# Patient Record
Sex: FEMALE | HISPANIC OR LATINO | Employment: FULL TIME | URBAN - METROPOLITAN AREA
[De-identification: names, ages, dates, MRNs, and addresses within clinical notes are randomized per-mention and may not be internally consistent; named-entity substitution may affect disease eponyms.]

---

## 2024-05-16 ENCOUNTER — OFFICE VISIT (OUTPATIENT)
Dept: INTERNAL MEDICINE | Facility: CLINIC | Age: 34
End: 2024-05-16
Payer: COMMERCIAL

## 2024-05-16 VITALS
DIASTOLIC BLOOD PRESSURE: 83 MMHG | SYSTOLIC BLOOD PRESSURE: 122 MMHG | HEART RATE: 80 BPM | OXYGEN SATURATION: 98 % | WEIGHT: 218.3 LBS

## 2024-05-16 DIAGNOSIS — Z11.59 NEED FOR HEPATITIS C SCREENING TEST: ICD-10-CM

## 2024-05-16 DIAGNOSIS — Z12.4 CERVICAL CANCER SCREENING: ICD-10-CM

## 2024-05-16 DIAGNOSIS — E11.29 TYPE 2 DIABETES MELLITUS WITH DIABETIC MICROALBUMINURIA, WITHOUT LONG-TERM CURRENT USE OF INSULIN (H): Primary | ICD-10-CM

## 2024-05-16 DIAGNOSIS — R80.9 TYPE 2 DIABETES MELLITUS WITH DIABETIC MICROALBUMINURIA, WITHOUT LONG-TERM CURRENT USE OF INSULIN (H): Primary | ICD-10-CM

## 2024-05-16 DIAGNOSIS — Z11.4 SCREENING FOR HIV (HUMAN IMMUNODEFICIENCY VIRUS): ICD-10-CM

## 2024-05-16 PROCEDURE — 99203 OFFICE O/P NEW LOW 30 MIN: CPT | Mod: GC

## 2024-05-16 RX ORDER — METFORMIN HCL 500 MG
500 TABLET, EXTENDED RELEASE 24 HR ORAL
COMMUNITY
Start: 2024-04-19 | End: 2024-07-18

## 2024-05-16 RX ORDER — TIMOLOL MALEATE 5 MG/ML
1 SOLUTION/ DROPS OPHTHALMIC DAILY
COMMUNITY

## 2024-05-16 NOTE — PATIENT INSTRUCTIONS
"We prescribed you a new glucometer   - You do not have to check your blood sugars daily, but if you do, check it in the morning before breakfast.   - Check your blood sugar if you feel \"different\", Sweating, fatigue, tremors would be signs of low blood sugar.   - Goal Blood sugar should be 125      "

## 2024-05-16 NOTE — PROGRESS NOTES
Assessment & Plan     Ms. Kwan is a 33-year-old woman with PMHx notable for recently diagnosed type 2 diabetes, who presents to establish care and to discuss her diabetes care.     Type 2 diabetes mellitus with diabetic microalbuminuria, without long-term current use of insulin (H)  Patient recently was seen by her family medicine physician with a A1c of 8.  UA noted protein with slightly elevated albumin/creatinine ratio.  Patient also notes neuropathy in her bilateral lower extremities no vision concerns.  Patient was instructed to follow-up with an ophthalmologist for diabetic eye exam which she has yet to schedule.  Provided patient with referral to diabetes education and glucometer.  Instructed patient to check blood sugar once a day prior to breakfast with a goal blood sugar less than 125.  Will follow-up with patient in 3 months to repeat A1c. Noted high triglycerides with LDL of 101 and HDL of 35. No indications for statins currently.  - Adult Diabetes Education  Referral; Future  - Comprehensive metabolic panel (BMP + Alb, Alk Phos, ALT, AST, Total. Bili, TP); Future  - blood glucose monitoring (NO BRAND SPECIFIED) meter device kit; Use to test blood sugar 1 times daily or as directed.  - blood glucose (NO BRAND SPECIFIED) lancets standard; Use to test blood sugar 1 times daily or as directed.  - blood glucose (NO BRAND SPECIFIED) test strip; Use to test blood sugar 1 times daily or as directed.    Screening for HIV (human immunodeficiency virus)  - HIV Screening; Future    Cervical cancer screening  Patient last had Pap smear 2021.  Patient is due for repeat Pap smear.  Will do Pap smear at 3-month follow-up.          Return in about 3 months (around 8/16/2024).    Dusty Kaplan is a 33 year old, presenting for the following health issues:  Establish Care (Pt here to establish care and discuss diabetes concerns)      5/16/2024     8:06 AM   Additional Questions   Roomed by MJL, EMT      History of Present Illness       Diabetes:   She presents for follow up of diabetes.    She is not checking blood glucose.        She is concerned about low blood sugar, several less than 70 in the past few weeks.    She is not experiencing numbness or burning in feet, excessive thirst, blurry vision, weight changes or redness, sores or blisters on feet.           She eats 0-1 servings of fruits and vegetables daily.She consumes 0 sweetened beverage(s) daily.She exercises with enough effort to increase her heart rate 20 to 29 minutes per day.  She exercises with enough effort to increase her heart rate 4 days per week.   She is taking medications regularly.    Patient noted that she recently saw her family medicine physician due to an elevated A1c of 8.  She was concerned about hypoglycemia episodes and whether or not she needs a glucometer.  She notes that she has multiple family members with type 2 diabetes and they all checked her blood sugars regularly she was wondering if she needs to do the same.  Patient notes she had 1 episode of uncharacteristic fatigue and dizziness that improved with oral intake.  She wonders if this episode was hypoglycemia.  She also has several questions regarding what diabetes is and how it can be managed. Patient notes that she has attempted some lifestyle modifications with decreased sugary drinks and increased exercise.    Review of Systems  Constitutional, HEENT, cardiovascular, pulmonary, gi and gu systems are negative, except as otherwise noted.      Objective    /83 (BP Location: Left arm, Patient Position: Sitting, Cuff Size: Adult Regular)   Pulse 80   Wt 99 kg (218 lb 4.8 oz)   LMP 05/09/2024 (Approximate)   SpO2 98%   There is no height or weight on file to calculate BMI.  Physical Exam   GENERAL: alert and no distress  NECK: no adenopathy, no asymmetry, masses, or scars  RESP: lungs clear to auscultation - no rales, rhonchi or wheezes  CV: regular rate and  rhythm, normal S1 S2, no S3 or S4, no murmur, click or rub, no peripheral edema  ABDOMEN: soft, nontender, no hepatosplenomegaly, no masses and bowel sounds normal  MS: no gross musculoskeletal defects noted, no edema    No results found for any previous visit.           Signed Electronically by: KOFFI LUBIN MD

## 2024-05-19 ENCOUNTER — HEALTH MAINTENANCE LETTER (OUTPATIENT)
Age: 34
End: 2024-05-19

## 2024-05-24 ENCOUNTER — LAB (OUTPATIENT)
Dept: LAB | Facility: CLINIC | Age: 34
End: 2024-05-24
Payer: COMMERCIAL

## 2024-05-24 ENCOUNTER — TELEPHONE (OUTPATIENT)
Dept: INTERNAL MEDICINE | Facility: CLINIC | Age: 34
End: 2024-05-24

## 2024-05-24 DIAGNOSIS — E11.29 TYPE 2 DIABETES MELLITUS WITH DIABETIC MICROALBUMINURIA, WITHOUT LONG-TERM CURRENT USE OF INSULIN (H): ICD-10-CM

## 2024-05-24 DIAGNOSIS — Z11.4 SCREENING FOR HIV (HUMAN IMMUNODEFICIENCY VIRUS): ICD-10-CM

## 2024-05-24 DIAGNOSIS — R80.9 TYPE 2 DIABETES MELLITUS WITH DIABETIC MICROALBUMINURIA, WITHOUT LONG-TERM CURRENT USE OF INSULIN (H): ICD-10-CM

## 2024-05-24 LAB
ALBUMIN SERPL BCG-MCNC: 4.5 G/DL (ref 3.5–5.2)
ALP SERPL-CCNC: 100 U/L (ref 40–150)
ALT SERPL W P-5'-P-CCNC: 58 U/L (ref 0–50)
ANION GAP SERPL CALCULATED.3IONS-SCNC: 11 MMOL/L (ref 7–15)
AST SERPL W P-5'-P-CCNC: 43 U/L (ref 0–45)
BILIRUB SERPL-MCNC: 0.9 MG/DL
BUN SERPL-MCNC: 10.1 MG/DL (ref 6–20)
CALCIUM SERPL-MCNC: 9.7 MG/DL (ref 8.6–10)
CHLORIDE SERPL-SCNC: 103 MMOL/L (ref 98–107)
CREAT SERPL-MCNC: 0.56 MG/DL (ref 0.51–0.95)
DEPRECATED HCO3 PLAS-SCNC: 24 MMOL/L (ref 22–29)
EGFRCR SERPLBLD CKD-EPI 2021: >90 ML/MIN/1.73M2
GLUCOSE SERPL-MCNC: 87 MG/DL (ref 70–99)
POTASSIUM SERPL-SCNC: 3.9 MMOL/L (ref 3.4–5.3)
PROT SERPL-MCNC: 7.8 G/DL (ref 6.4–8.3)
SODIUM SERPL-SCNC: 138 MMOL/L (ref 135–145)

## 2024-05-24 PROCEDURE — 36415 COLL VENOUS BLD VENIPUNCTURE: CPT

## 2024-05-24 PROCEDURE — 87389 HIV-1 AG W/HIV-1&-2 AB AG IA: CPT

## 2024-05-24 PROCEDURE — 80053 COMPREHEN METABOLIC PANEL: CPT

## 2024-05-25 LAB — HIV 1+2 AB+HIV1 P24 AG SERPL QL IA: NONREACTIVE

## 2024-05-30 ENCOUNTER — VIRTUAL VISIT (OUTPATIENT)
Dept: EDUCATION SERVICES | Facility: CLINIC | Age: 34
End: 2024-05-30
Attending: HOSPITALIST
Payer: COMMERCIAL

## 2024-05-30 ENCOUNTER — MYC MEDICAL ADVICE (OUTPATIENT)
Dept: EDUCATION SERVICES | Facility: CLINIC | Age: 34
End: 2024-05-30

## 2024-05-30 DIAGNOSIS — E11.29 TYPE 2 DIABETES MELLITUS WITH DIABETIC MICROALBUMINURIA, WITHOUT LONG-TERM CURRENT USE OF INSULIN (H): ICD-10-CM

## 2024-05-30 DIAGNOSIS — R80.9 TYPE 2 DIABETES MELLITUS WITH DIABETIC MICROALBUMINURIA, WITHOUT LONG-TERM CURRENT USE OF INSULIN (H): ICD-10-CM

## 2024-05-30 DIAGNOSIS — E11.29 TYPE 2 DIABETES MELLITUS WITH DIABETIC MICROALBUMINURIA, WITHOUT LONG-TERM CURRENT USE OF INSULIN (H): Primary | ICD-10-CM

## 2024-05-30 DIAGNOSIS — R80.9 TYPE 2 DIABETES MELLITUS WITH DIABETIC MICROALBUMINURIA, WITHOUT LONG-TERM CURRENT USE OF INSULIN (H): Primary | ICD-10-CM

## 2024-05-30 PROCEDURE — G0108 DIAB MANAGE TRN  PER INDIV: HCPCS | Mod: 95 | Performed by: DIETITIAN, REGISTERED

## 2024-05-30 RX ORDER — ACYCLOVIR 400 MG/1
TABLET ORAL
Qty: 3 EACH | Refills: 11 | Status: SHIPPED | OUTPATIENT
Start: 2024-05-30

## 2024-05-30 NOTE — PATIENT INSTRUCTIONS
"Hi Kaplan,    It was a pleasure meeting with you today!    Goals for Diabetes:    Check blood sugars at least 1 time(s) each day at varying times: before meals, after meals, and at bedtime.    Blood Glucose Targets:   Fasting and before meal: 80 - 130   2 hours after the start of a meal: less than 180    Continuous Glucose Monitor Goals:    Glucose Target Range  Recommended Time in Target Range   70 to 180 70% or more of the time   Less than 70 Less than 4% of the time   Above 180 Less than 25% of time       Activity helps to improve blood sugars. Try to incorporate 150 minutes per week (at least 10 minute at a time, and at least every other day)    Eat three balanced meals each day, consider using plate planning method, aiming for a variety of food groups including 1/4 plate starch/grains, 1/4 plate lean protein, and 1/2 plate non-starchy vegetables. Focus on \"high quality\" carbohydrates (whole grains, starchy vegetables, fresh fruits, beans/legumes). Limit added sugar and refined carbohydrates as much as possible.    Take diabetes medications as prescribed.    If you are overweight aim for a 5% weight loss. This will improve blood sugars, cholesterol, and blood pressure!     Follow up:  With PCP  With Diabetes Education as needed    Call or [employer] with any questions.    Contact information:   If you have concerns, please send a [employer] message or call the clinic at [phone].    For more urgent concerns that do not require 700, please call [phone] after hours/weekends and ask to speak with the Endocrinologist on call.    To schedule a Diabetes Education appointment, call [phone]    Please let us know if you are having low blood sugars less than 70 or over 300 mg/dL.  Do not wait until your next appointment if this is happening.      Sarah PAIZ  Diabetes Educator  [facility] [address] [address]  Phone: [phone]  Email: " [email or web address]

## 2024-05-30 NOTE — PROGRESS NOTES
Virtual Visit Details    Type of service:  Video Visit   Start Time: 10:00 am  End time: 10:30 am  Originating Location (pt. Location): Home    Distant Location (provider location):  On-site  Platform used for Video Visit: [employer]

## 2024-05-30 NOTE — PROGRESS NOTES
"Diabetes Self-Management Education & Support    Rosaura Kwan is a 33 year old who presents today for education related to Type 2 Diabetes  Patient is being treated with:  diet, oral agents, and exercise  She is accompanied by self    Year of diagnosis: April 15, 2024  Referring provider:  Kiel Holliday  Living Situation: with daughter  Employment: did not ask    PATIENT CONCERNS RELATED TO DIABETES SELF MANAGEMENT: new diagnosis      ASSESSMENT:  T2D seen for Comprehensive Knowledge Assessment and Instruction at the request of Dr. Holliday and Dr. Low. This is a new diagnosis for Rosaura. Started on Metformin 500 mg daily. She has made some changes in diet and is adding more walking. Trying to add more veggies in diet. Eats meals with daughter and daughter enjoys eating this way. Reviewed carbs and impact on blood glucose. Discussed blood glucose monitoring vs CGM.  Maryann would be interested in CGM and diabetes medication that would also help with weight loss. I think she would be a good candidate for GLP-1 or SGLT-2 drug.    PLAN:  Check blood sugars at least 1 time(s) each day at varying times: before meals, after meals, and at bedtime.    Blood Glucose Targets:   Fasting and before meal: 80 - 130   2 hours after the start of a meal: less than 180    Continuous Glucose Monitor Goals:    Glucose Target Range  Recommended Time in Target Range   70 to 180 70% or more of the time   Less than 70 Less than 4% of the time   Above 180 Less than 25% of time       Activity helps to improve blood sugars. Try to incorporate 150 minutes per week (at least 10 minute at a time, and at least every other day)    Eat three balanced meals each day, consider using plate planning method, aiming for a variety of food groups including 1/4 plate starch/grains, 1/4 plate lean protein, and 1/2 plate non-starchy vegetables. Focus on \"high quality\" carbohydrates (whole grains, starchy vegetables, fresh fruits, " "beans/legumes). Limit added sugar and refined carbohydrates as much as possible.    Take diabetes medications as prescribed.    If you are overweight aim for a 5% weight loss. This will improve blood sugars, cholesterol, and blood pressure!       SUBJECTIVE / OBJECTIVE:  Presents for: Initial Assessment for new diagnosis  Accompanied by: Self  Diabetes education in the past 24mo: No  Diabetes type: Type 2  Date of diagnosis: April 15, 2024  Transportation concerns: No    Strong family history of T2D on her father's side. Her father and all of his siblings have diabetes.    Monitoring:  Monitoring Assessed Today: Yes  Did patient bring glucose meter to appointment? : Yes  Times checking blood sugar at home (number): 1  Times checking blood sugar at home (per): Day  BG results: fasting glucose- 120s per patient   CGM: none    Labs:  No results found for: \"A1C\"  Lab Results   Component Value Date    GLC 87 05/24/2024     No results found for: \"LDL\"  No results found for: \"HDL\"  GFR Estimate   Date Value Ref Range Status   05/24/2024 >90 >60 mL/min/1.73m2 Final     Lab Results   Component Value Date    CR 0.56 05/24/2024     No results found for: \"MICROALBUMIN\"  No results found for: \"GADAB\"  No results found for: \"CPEPT\"      Diabetes Symptoms & Complications:            Taking Medications:  Diabetes Medication(s)       Biguanides       metFORMIN (GLUCOPHAGE XR) 500 MG 24 hr tablet Take 500 mg by mouth     metFORMIN (GLUCOPHAGE) 500 MG tablet                Problem Solving:             Patient is at risk of hypoglycemia?: NO  Hypoglycemia symptoms: none  Hospitalizations for hyper or hypoglycemia: No    Reducing Risks:   From CareEverywhere    Patient's A1C goal: <7.0    Being Active:  Being Active Assessed Today: Yes  Exercise:: Yes  Days per week of moderate to strenuous exercise (like a brisk walk): 2  On average, minutes per day of exercise at this level: 30  How intense was your typical exercise? : Moderate (like " brisk walking)  Exercise Minutes per Week: 60      Nutrition:     Healthy Eating Assessed Today: Yes  Who cooks/prepares meals for you?: Self  Who purchases food in  your home?: Self  How many times a week on average do you eat food made away from home (restaurant/take-out)?: 2 (on the weekends, mostly)  Meals include: Breakfast, Lunch, Dinner  Breakfast: overnight oats, 2-3 hb eggs  Lunch: chicken, salmon with steamed veggies and salads  Dinner: brown rice, black beans, chicken steak  Snacks: adding more - apple or beef stick  Beverages: Water  Has patient met with a dietitian in the past?: No      Healthy Coping:     Sources of stress identified by patient: My health      Topics that were covered in today's visit:  Basic pathophysiology of T2DM, relationship between carbohydrate intake, release of glucose from the liver, exercise and weight management on glucose control.    Self-monitoring of blood glucose (SMBG) using blood glucose meter and/or continuous glucose monitoring device.    Target blood glucose for pre-meal and 2 hour post-prandial glucose.    Action, timing, and potential side-effects of diabetes medications: Metformin, GLP-1, SGLT-2    Basic meal planning using the plate method, emphasizing portion control, healthy food choices, and eliminating or minimizing sugary beverages.    Need for consistent physical activity, 30-45 minutes duration, preferably 4-6 days per week.            Patient-stated goal written and given to Rosaura Kwan.  Verbalized and demonstrated understanding of instructions.       See patient instructions.  AVS provided to patient.    Time spent with patient at today's visit was 30 minutes.      Any diabetes medication initiation or dose changes were made via the CDCES Standing Orders per the patient's referring provider. A copy of this encounter was shared with the provider.

## 2024-05-30 NOTE — TELEPHONE ENCOUNTER
Will order Dexcom G7 sensors for patient so she can learn how different foods and activities impact her glucose levels.    I think the personal CGM will help her make diet and lifestyle changes.    Sarah Mckeon, MARGUERITE, LD  Diabetes Educator

## 2024-05-30 NOTE — NURSING NOTE
Is the patient currently in the state of [place]? YES    Visit mode:VIDEO    If the visit is dropped, the patient can be reconnected by: VIDEO VISIT: Text to cell phone:   Telephone Information:   Mobile [phone]       Will anyone else be joining the visit? NO  (If patient encounters technical issues they should call [phone]    How would you like to obtain your AVS? MyChart    Are changes needed to the allergy or medication list? No    Are refills needed on medications prescribed by this physician? NO    Reason for visit: RECHECK (Type 2 diabetes )    Silvia COMBSF

## 2024-06-04 ENCOUNTER — MYC MEDICAL ADVICE (OUTPATIENT)
Dept: INTERNAL MEDICINE | Facility: CLINIC | Age: 34
End: 2024-06-04
Payer: COMMERCIAL

## 2024-07-18 ENCOUNTER — LAB (OUTPATIENT)
Dept: LAB | Facility: CLINIC | Age: 34
End: 2024-07-18
Payer: COMMERCIAL

## 2024-07-18 ENCOUNTER — OFFICE VISIT (OUTPATIENT)
Dept: INTERNAL MEDICINE | Facility: CLINIC | Age: 34
End: 2024-07-18
Payer: COMMERCIAL

## 2024-07-18 VITALS
SYSTOLIC BLOOD PRESSURE: 105 MMHG | OXYGEN SATURATION: 96 % | DIASTOLIC BLOOD PRESSURE: 75 MMHG | WEIGHT: 217.9 LBS | HEART RATE: 69 BPM

## 2024-07-18 DIAGNOSIS — Z11.59 NEED FOR HEPATITIS C SCREENING TEST: ICD-10-CM

## 2024-07-18 DIAGNOSIS — E11.29 TYPE 2 DIABETES MELLITUS WITH DIABETIC MICROALBUMINURIA, WITHOUT LONG-TERM CURRENT USE OF INSULIN (H): Primary | ICD-10-CM

## 2024-07-18 DIAGNOSIS — E11.29 TYPE 2 DIABETES MELLITUS WITH DIABETIC MICROALBUMINURIA, WITHOUT LONG-TERM CURRENT USE OF INSULIN (H): ICD-10-CM

## 2024-07-18 DIAGNOSIS — Z12.4 CERVICAL CANCER SCREENING: ICD-10-CM

## 2024-07-18 DIAGNOSIS — R80.9 TYPE 2 DIABETES MELLITUS WITH DIABETIC MICROALBUMINURIA, WITHOUT LONG-TERM CURRENT USE OF INSULIN (H): ICD-10-CM

## 2024-07-18 DIAGNOSIS — R80.9 TYPE 2 DIABETES MELLITUS WITH DIABETIC MICROALBUMINURIA, WITHOUT LONG-TERM CURRENT USE OF INSULIN (H): Primary | ICD-10-CM

## 2024-07-18 DIAGNOSIS — E11.9 DIABETES MELLITUS, TYPE 2 (H): Primary | ICD-10-CM

## 2024-07-18 PROBLEM — E78.6 LIPOPROTEIN DEFICIENCY: Status: ACTIVE | Noted: 2024-07-18

## 2024-07-18 PROBLEM — K76.0 FATTY (CHANGE OF) LIVER, NOT ELSEWHERE CLASSIFIED: Status: ACTIVE | Noted: 2024-07-18

## 2024-07-18 PROBLEM — N92.3 OVULATION BLEEDING: Status: ACTIVE | Noted: 2024-07-18

## 2024-07-18 LAB
CHOLEST SERPL-MCNC: 173 MG/DL
CREAT UR-MCNC: 87.9 MG/DL
FASTING STATUS PATIENT QL REPORTED: YES
HBA1C MFR BLD: 5.9 %
HCV AB SERPL QL IA: NONREACTIVE
HDLC SERPL-MCNC: 47 MG/DL
LDLC SERPL CALC-MCNC: 105 MG/DL
MICROALBUMIN UR-MCNC: 18.2 MG/L
MICROALBUMIN/CREAT UR: 20.71 MG/G CR (ref 0–25)
NONHDLC SERPL-MCNC: 126 MG/DL
TRIGL SERPL-MCNC: 107 MG/DL

## 2024-07-18 PROCEDURE — 80061 LIPID PANEL: CPT | Performed by: PATHOLOGY

## 2024-07-18 PROCEDURE — 86803 HEPATITIS C AB TEST: CPT | Performed by: HOSPITALIST

## 2024-07-18 PROCEDURE — 36415 COLL VENOUS BLD VENIPUNCTURE: CPT | Performed by: PATHOLOGY

## 2024-07-18 PROCEDURE — 82043 UR ALBUMIN QUANTITATIVE: CPT | Performed by: HOSPITALIST

## 2024-07-18 PROCEDURE — 99214 OFFICE O/P EST MOD 30 MIN: CPT | Mod: GC

## 2024-07-18 PROCEDURE — 99000 SPECIMEN HANDLING OFFICE-LAB: CPT | Performed by: PATHOLOGY

## 2024-07-18 PROCEDURE — 83036 HEMOGLOBIN GLYCOSYLATED A1C: CPT | Performed by: PEDIATRICS

## 2024-07-18 RX ORDER — METFORMIN HCL 500 MG
500 TABLET, EXTENDED RELEASE 24 HR ORAL
Qty: 90 TABLET | Refills: 3 | Status: SHIPPED | OUTPATIENT
Start: 2024-07-18

## 2024-07-18 NOTE — PROGRESS NOTES
I, Aron Barriga MD saw the patient with the resident, and agree with the resident's findings and plan of care as documented in the resident's note.  /75 (BP Location: Left arm, Patient Position: Sitting, Cuff Size: Adult Large)   Pulse 69   Wt 98.8 kg (217 lb 14.4 oz)   LMP 06/27/2024 (Approximate)   SpO2 96%   I personally reviewed vital signs and past record.  Key findings: Diabetes update. Had an anomalous low. CGM seems to be followed by PCP in  system.

## 2024-07-18 NOTE — PROGRESS NOTES
Assessment & Plan   33-year-old woman with PMHx of type 2 diabetes who presents for follow-up of diabetes management.    Type 2 diabetes mellitus with diabetic microalbuminuria, without long-term current use of insulin (H)  Patient was given CGM by prior PCP.  A1c was 8.0 in 4/2024.  Will plan to repeat A1c measurement today.  Refilled metformin extended release as well.  Patient reports occasional episodes of diaphoresis and tremors associated with blood glucose 80s to 90s on CGM.  Provided reassurance to patient and opted to monitor closely.  Would not attribute hypoglycemia to use of metformin.  Discussed with patient endocrine referral given patient utilization of CGM and patient concerns about hypoglycemia.  Our clinic unfortunately does not have the ability to track CGM levels closely outside of patient reports.  - HEMOGLOBIN A1C; Future  - metFORMIN (GLUCOPHAGE XR) 500 MG 24 hr tablet; Take 1 tablet (500 mg) by mouth daily (with dinner)  - Adult Endocrinology  Referral; Future    Cervical cancer screening  Return in 1 week for Pap smear testing in clinic       Need for hepatitis C screening test  - Ordered Hep C screening      1 week to for Pap Smear    Return in about 1 week (around 7/25/2024) for Follow up, with me.    Dusty Kaplan is a 33 year old, presenting for the following health issues:  Follow Up (3 months follow up; has eye appointment scheduled for sometime this month ) and Medication Refill      7/18/2024     7:34 AM   Additional Questions   Roomed by MR     History of Present Illness       Diabetes:   She presents for follow up of diabetes.   She is checking home blood glucose with a continuous glucose monitor.   She checks blood glucose before and after meals.  Blood glucose is never over 200 and never under 70. She is aware of hypoglycemia symptoms including shakiness, blurred vision and other.    She has no concerns regarding her diabetes at this time.  She is having blurry  vision.  The patient has not had a diabetic eye exam in the last 12 months.          She eats 2-3 servings of fruits and vegetables daily.She consumes 0 sweetened beverage(s) daily.She exercises with enough effort to increase her heart rate 30 to 60 minutes per day.  She exercises with enough effort to increase her heart rate 4 days per week. She is missing 1 dose(s) of medications per week.     Reports that she is doing well overall, denies any recent illnesses, fevers, chills, chest pain, shortness of breath, abdominal pain, or new lower extremity edema.  Patient was concerned about occasional hypoglycemia with her CGM noting blood sugars in the 80s.  She also concurrently felt diaphoretic and tremors.  This occurred once last month she also noted this occurred prior to starting metformin.  She was worried that metformin was causing hypoglycemia.  Reassurance was given.  Patient reports that during these episodes she would eat a protein bar that would help her symptoms.  She otherwise notes no other concerns     Review of Systems  Constitutional, HEENT, cardiovascular, pulmonary, gi and gu systems are negative, except as otherwise noted.      Objective    /75 (BP Location: Left arm, Patient Position: Sitting, Cuff Size: Adult Large)   Pulse 69   Wt 98.8 kg (217 lb 14.4 oz)   LMP 06/27/2024 (Approximate)   SpO2 96%   There is no height or weight on file to calculate BMI.  Physical Exam   GENERAL: alert and no distress  NECK: no adenopathy, no asymmetry, masses, or scars  RESP: lungs clear to auscultation - no rales, rhonchi or wheezes  CV: regular rate and rhythm, normal S1 S2, no S3 or S4, no murmur, click or rub, no peripheral edema  ABDOMEN: soft, nontender, no hepatosplenomegaly, no masses and bowel sounds normal  MS: no gross musculoskeletal defects noted, no edema,     Lab on 05/24/2024   Component Date Value Ref Range Status    HIV Antigen Antibody Combo 05/24/2024 Nonreactive  Nonreactive Final     Negative HIV-1 p24 antigen and HIV-1/2 antibody screening test results usually indicate the absence of HIV-1 and HIV-2 infection. However, such negative results do not rule-out acute HIV infection.  If acute HIV-1 or HIV-2 infection is suspected, detection of HIV-1 or HIV-2 RNA  is recommended.     Sodium 05/24/2024 138  135 - 145 mmol/L Final    Reference intervals for this test were updated on 09/26/2023 to more accurately reflect our healthy population. There may be differences in the flagging of prior results with similar values performed with this method. Interpretation of those prior results can be made in the context of the updated reference intervals.     Potassium 05/24/2024 3.9  3.4 - 5.3 mmol/L Final    Carbon Dioxide (CO2) 05/24/2024 24  22 - 29 mmol/L Final    Anion Gap 05/24/2024 11  7 - 15 mmol/L Final    Urea Nitrogen 05/24/2024 10.1  6.0 - 20.0 mg/dL Final    Creatinine 05/24/2024 0.56  0.51 - 0.95 mg/dL Final    GFR Estimate 05/24/2024 >90  >60 mL/min/1.73m2 Final    Calcium 05/24/2024 9.7  8.6 - 10.0 mg/dL Final    Chloride 05/24/2024 103  98 - 107 mmol/L Final    Glucose 05/24/2024 87  70 - 99 mg/dL Final    Alkaline Phosphatase 05/24/2024 100  40 - 150 U/L Final    AST 05/24/2024 43  0 - 45 U/L Final    Reference intervals for this test were updated on 6/12/2023 to more accurately reflect our healthy population. There may be differences in the flagging of prior results with similar values performed with this method. Interpretation of those prior results can be made in the context of the updated reference intervals.    ALT 05/24/2024 58 (H)  0 - 50 U/L Final    Reference intervals for this test were updated on 6/12/2023 to more accurately reflect our healthy population. There may be differences in the flagging of prior results with similar values performed with this method. Interpretation of those prior results can be made in the context of the updated reference intervals.      Protein Total  05/24/2024 7.8  6.4 - 8.3 g/dL Final    Albumin 05/24/2024 4.5  3.5 - 5.2 g/dL Final    Bilirubin Total 05/24/2024 0.9  <=1.2 mg/dL Final           Signed Electronically by: KOFFI LUBIN MD

## 2024-07-18 NOTE — RESULT ENCOUNTER NOTE
Improved A1c of 5.9, down from 8.0 in 4/15/2024. Will continue metformin for now with no changes to her diabetes medications.

## 2024-10-30 NOTE — CONFIDENTIAL NOTE
RECORDS RECEIVED FROM: internal    DATE RECEIVED: 11.15.24    NOTES (FOR ALL VISITS) STATUS DETAILS   OFFICE NOTES from referring provider internal  Aron Barriga MD Williams, Donovan Dewayne, MD      OFFICE NOTES from other specialist internal  [facility]18[facility]24 [facility]   [facility]      MEDICATION LIST internal     LABS     DIABETES: HBGA1C, CREATININE, FASTING LIPIDS, MICROALBUMIN URINE, POTASSIUM, TSH, T4    THYROID: TSH, T4, CBC, THYRODLONULIN, TOTAL T3, FREE T4, CALCITONIN, CEA internal /ce

## 2024-11-15 ENCOUNTER — PRE VISIT (OUTPATIENT)
Dept: ENDOCRINOLOGY | Facility: CLINIC | Age: 34
End: 2024-11-15

## 2024-12-15 ENCOUNTER — HEALTH MAINTENANCE LETTER (OUTPATIENT)
Age: 34
End: 2024-12-15

## 2025-03-16 ENCOUNTER — HEALTH MAINTENANCE LETTER (OUTPATIENT)
Age: 35
End: 2025-03-16

## 2025-05-18 ENCOUNTER — HEALTH MAINTENANCE LETTER (OUTPATIENT)
Age: 35
End: 2025-05-18

## 2025-06-29 ENCOUNTER — HEALTH MAINTENANCE LETTER (OUTPATIENT)
Age: 35
End: 2025-06-29